# Patient Record
Sex: MALE | URBAN - NONMETROPOLITAN AREA
[De-identification: names, ages, dates, MRNs, and addresses within clinical notes are randomized per-mention and may not be internally consistent; named-entity substitution may affect disease eponyms.]

---

## 2024-08-07 ENCOUNTER — APPOINTMENT (OUTPATIENT)
Dept: URBAN - NONMETROPOLITAN AREA CLINIC 49 | Age: 74
Setting detail: DERMATOLOGY
End: 2024-08-09

## 2024-08-07 DIAGNOSIS — L82.1 OTHER SEBORRHEIC KERATOSIS: ICD-10-CM

## 2024-08-07 PROCEDURE — 99202 OFFICE O/P NEW SF 15 MIN: CPT

## 2024-08-07 PROCEDURE — OTHER COUNSELING: OTHER

## 2024-08-07 ASSESSMENT — LOCATION DETAILED DESCRIPTION DERM: LOCATION DETAILED: PERIUMBILICAL SKIN

## 2024-08-07 ASSESSMENT — LOCATION SIMPLE DESCRIPTION DERM: LOCATION SIMPLE: ABDOMEN

## 2024-08-07 ASSESSMENT — LOCATION ZONE DERM: LOCATION ZONE: TRUNK

## 2024-08-07 NOTE — PROCEDURE: COUNSELING
Detail Level: Simple
Patient Specific Counseling (Will Not Stick From Patient To Patient): Offered LN2 , patient declines due to cost.

## 2025-04-14 ENCOUNTER — CONSULTATION/EVALUATION (OUTPATIENT)
Age: 75
End: 2025-04-14

## 2025-04-14 DIAGNOSIS — H25.813: ICD-10-CM

## 2025-04-14 PROCEDURE — 92025 CPTRIZED CORNEAL TOPOGRAPHY: CPT | Mod: NC

## 2025-04-14 PROCEDURE — 92136 OPHTHALMIC BIOMETRY: CPT

## 2025-04-14 PROCEDURE — 92134 CPTRZ OPH DX IMG PST SGM RTA: CPT | Mod: NC

## 2025-04-14 PROCEDURE — 99204 OFFICE O/P NEW MOD 45 MIN: CPT

## 2025-04-30 ENCOUNTER — PRE-OP/H&P (OUTPATIENT)
Age: 75
End: 2025-04-30

## 2025-04-30 VITALS
WEIGHT: 199 LBS | HEIGHT: 70 IN | BODY MASS INDEX: 28.49 KG/M2 | DIASTOLIC BLOOD PRESSURE: 74 MMHG | HEART RATE: 64 BPM | SYSTOLIC BLOOD PRESSURE: 128 MMHG

## 2025-04-30 DIAGNOSIS — Z01.818: ICD-10-CM

## 2025-04-30 DIAGNOSIS — E11.9: ICD-10-CM

## 2025-04-30 DIAGNOSIS — I10: ICD-10-CM

## 2025-04-30 DIAGNOSIS — E78.00: ICD-10-CM

## 2025-04-30 DIAGNOSIS — K21.9: ICD-10-CM

## 2025-04-30 DIAGNOSIS — I51.9: ICD-10-CM

## 2025-04-30 PROCEDURE — 99213 OFFICE O/P EST LOW 20 MIN: CPT

## 2025-05-14 ENCOUNTER — PRE-OP/H&P (OUTPATIENT)
Age: 75
End: 2025-05-14

## 2025-05-14 VITALS
BODY MASS INDEX: 28.49 KG/M2 | HEART RATE: 59 BPM | HEIGHT: 70 IN | DIASTOLIC BLOOD PRESSURE: 72 MMHG | SYSTOLIC BLOOD PRESSURE: 128 MMHG | WEIGHT: 199 LBS

## 2025-05-14 DIAGNOSIS — E78.00: ICD-10-CM

## 2025-05-14 DIAGNOSIS — E11.9: ICD-10-CM

## 2025-05-14 DIAGNOSIS — I51.9: ICD-10-CM

## 2025-05-14 DIAGNOSIS — K21.9: ICD-10-CM

## 2025-05-14 DIAGNOSIS — Z01.818: ICD-10-CM

## 2025-05-14 DIAGNOSIS — I10: ICD-10-CM

## 2025-05-14 PROCEDURE — 99213 OFFICE O/P EST LOW 20 MIN: CPT | Mod: NC

## 2025-05-15 ENCOUNTER — SURGERY/PROCEDURE (OUTPATIENT)
Age: 75
End: 2025-05-15

## 2025-05-15 ENCOUNTER — POST-OP (OUTPATIENT)
Age: 75
End: 2025-05-15

## 2025-05-15 DIAGNOSIS — Z98.42: ICD-10-CM

## 2025-05-15 DIAGNOSIS — H25.812: ICD-10-CM

## 2025-05-15 PROCEDURE — 99024 POSTOP FOLLOW-UP VISIT: CPT

## 2025-05-15 PROCEDURE — 6698454 REMOVE CATARACT;INSERT LENS (SX ONLY): Mod: 54,LT

## 2025-06-05 ENCOUNTER — SURGERY/PROCEDURE (OUTPATIENT)
Age: 75
End: 2025-06-05

## 2025-06-05 ENCOUNTER — POST-OP (OUTPATIENT)
Age: 75
End: 2025-06-05

## 2025-06-05 DIAGNOSIS — Z98.42: ICD-10-CM

## 2025-06-05 DIAGNOSIS — H25.811: ICD-10-CM

## 2025-06-05 DIAGNOSIS — Z98.41: ICD-10-CM

## 2025-06-05 PROCEDURE — 6698454 REMOVE CATARACT;INSERT LENS (SX ONLY): Mod: 54,RT,79,RT

## 2025-06-05 PROCEDURE — 99024 POSTOP FOLLOW-UP VISIT: CPT
